# Patient Record
Sex: MALE | Race: WHITE
[De-identification: names, ages, dates, MRNs, and addresses within clinical notes are randomized per-mention and may not be internally consistent; named-entity substitution may affect disease eponyms.]

---

## 2017-07-18 ENCOUNTER — HOSPITAL ENCOUNTER (OUTPATIENT)
Dept: HOSPITAL 47 - RADCTMAIN | Age: 52
Discharge: HOME | End: 2017-07-18
Payer: COMMERCIAL

## 2017-07-18 DIAGNOSIS — K57.32: Primary | ICD-10-CM

## 2017-07-18 DIAGNOSIS — N20.0: ICD-10-CM

## 2017-07-18 DIAGNOSIS — K57.30: ICD-10-CM

## 2017-07-18 PROCEDURE — 74176 CT ABD & PELVIS W/O CONTRAST: CPT

## 2017-07-18 NOTE — CT
EXAMINATION TYPE: CT abdomen pelvis wo con

 

DATE OF EXAM: 7/18/2017

 

COMPARISON: NONE

 

HISTORY: Pt states of abdominal pain.

 

CT DLP: 1036 mGycm

Automated exposure control for dose reduction was used.

 

TECHNIQUE:  Helical acquisition of images was performed from the lung bases through the pelvis.

 

FINDINGS: 

 

Lung bases are clear. There is no pleural effusion.

 

Liver shows a 3 cm cyst in the anterior left lobe.

 

Spleen pancreas gallbladder appear normal. Bile ducts are not dilated.

 

There is no adrenal mass. Kidneys have normal size and contour. There is no hydronephrosis. There is 
a 3 mm calcification in the left kidney. There is no sign of a renal mass. There is no retroperitonea
l adenopathy. Appendix appears normal.

 

There are multiple sigmoid diverticula. There are some inflammatory changes and wall thickening of th
e mid sigmoid colon. There is fat stranding. Bladder distends smoothly.

 

There is no ascites. There is no sign of free air. There is no evidence of bowel obstruction. Bony st
ructures are intact.

IMPRESSION: 

THERE ARE SIGMOID MULTIPLE DIVERTICULA. THERE IS EVIDENCE OF MID SIGMOID DIVERTICULITIS.

 

NONOBSTRUCTING LEFT RENAL CALCULUS. NORMAL APPENDIX. 3 cm hepatic cyst.

## 2022-02-17 ENCOUNTER — HOSPITAL ENCOUNTER (OUTPATIENT)
Dept: HOSPITAL 47 - LABWHC1 | Age: 57
Discharge: HOME | End: 2022-02-17
Attending: SURGERY
Payer: COMMERCIAL

## 2022-02-17 DIAGNOSIS — Z20.822: Primary | ICD-10-CM

## 2022-02-21 ENCOUNTER — HOSPITAL ENCOUNTER (OUTPATIENT)
Dept: HOSPITAL 47 - ORWHC2ENDO | Age: 57
Discharge: HOME | End: 2022-02-21
Attending: SURGERY
Payer: COMMERCIAL

## 2022-02-21 VITALS — RESPIRATION RATE: 16 BRPM

## 2022-02-21 VITALS — SYSTOLIC BLOOD PRESSURE: 132 MMHG | HEART RATE: 64 BPM | DIASTOLIC BLOOD PRESSURE: 87 MMHG

## 2022-02-21 VITALS — BODY MASS INDEX: 31.8 KG/M2

## 2022-02-21 VITALS — TEMPERATURE: 98.3 F

## 2022-02-21 DIAGNOSIS — Z88.1: ICD-10-CM

## 2022-02-21 DIAGNOSIS — Z79.899: ICD-10-CM

## 2022-02-21 DIAGNOSIS — D12.3: ICD-10-CM

## 2022-02-21 DIAGNOSIS — Z80.9: ICD-10-CM

## 2022-02-21 DIAGNOSIS — K64.1: ICD-10-CM

## 2022-02-21 DIAGNOSIS — Z88.2: ICD-10-CM

## 2022-02-21 DIAGNOSIS — Z87.891: ICD-10-CM

## 2022-02-21 DIAGNOSIS — K57.31: ICD-10-CM

## 2022-02-21 DIAGNOSIS — K64.4: ICD-10-CM

## 2022-02-21 DIAGNOSIS — Z98.890: ICD-10-CM

## 2022-02-21 DIAGNOSIS — F41.9: ICD-10-CM

## 2022-02-21 DIAGNOSIS — Z12.11: Primary | ICD-10-CM

## 2022-02-21 DIAGNOSIS — Z82.49: ICD-10-CM

## 2022-02-21 DIAGNOSIS — Z88.0: ICD-10-CM

## 2022-02-21 DIAGNOSIS — E78.5: ICD-10-CM

## 2022-02-21 PROCEDURE — 45380 COLONOSCOPY AND BIOPSY: CPT

## 2022-02-21 PROCEDURE — 88305 TISSUE EXAM BY PATHOLOGIST: CPT

## 2022-02-21 NOTE — P.PCN
Date of Procedure: 02/21/22


Description of Procedure: 




















PREOPERATIVE DIAGNOSIS:


Colonoscopy screening





POSTOPERATIVE DIAGNOSIS:


Tubular adenoma transverse colon


Sigmoid diverticulosis with bleeding


Internal hemorrhoids, grade 2





OPERATION:


Colonoscopy to the ileocecal valve and appendiceal orifice, cecum


Colonoscopy with cold forceps biopsy





SURGEON: Lindsay Marinelli MD.





ANESTHESIA: MAC.





INDICATIONS:


The patient is an 56-year-old male who presents for his first colonoscopy 

screening.  He reports intermittent rectal bleeding from fissures.  Benefits and

risks were described and informed consent was obtained.





DESCRIPTION OF PROCEDURE:


The patient had undergone Sutab prep.  The patient had been brought into the 

operating room and laid in the left lateral decubitus position.  After adequate 

intravenous sedation, the rectum was examined with 2% lidocaine jelly. The 

prostate was unremarkable.  External hemorrhoids were encountered. The rectal 

tone was within normal limits. No lesions were palpated in the rectal vault. An 

Olympus colonoscope was advanced until the cecum, ileocecal valve and 

appendiceal orifice were clearly viewed.  The prep was excellent.  Sigmoid 

diverticulosis was encountered.  Colonic polyps were found and removed.  Active 

bleeding from a single sigmoid diverticulum was identified.  Retroflexion of the

scope demonstrated grade 2 internal hemorrhoids without active bleeding or 

inflammation. The colon was desufflated. The patient had tolerated the procedure

well. 





Withdrawal time was over 6 minutes.





FINDINGS:


Aronchick preparation quality scale 1 (1-5)


Internal hemorrhoids, grade 2


External hemorrhoids, grade 2.


No arteriovenous malformations.


Sigmoid diverticulosis with bleeding from single diverticulum


Removal of 1 polyp:


- Cold forceps biopsy at mid transverse colon, 5 mm polyp.


No focal colitis.





RECOMMENDATIONS:


Repeat colonoscopy in 3 years, 2025





Plan - Discharge Summary


Discharge Rx Participant: No


New Discharge Prescriptions: 


Continue


   Rosuvastatin [Crestor] 20 mg PO DAILY


   ALPRAZolam [Xanax] 0.25 mg PO DAILY PRN


     PRN Reason: Anxiety


   Sertraline [Zoloft] 150 mg PO DAILY


Discharge Medication List





ALPRAZolam [Xanax] 0.25 mg PO DAILY PRN 12/14/21 [History]


Rosuvastatin [Crestor] 20 mg PO DAILY 12/14/21 [History]


Sertraline [Zoloft] 150 mg PO DAILY 12/14/21 [History]








Follow up Appointment(s)/Referral(s): 


Lindsay Marinelli MD [STAFF PHYSICIAN] - 03/01/22


Patient Instructions/Handouts:  Diverticulosis (DC), Diverticulosis Diet (GEN)


Discharge Disposition: HOME SELF-CARE

## 2022-02-21 NOTE — P.GSHP
History of Present Illness


H&P Date: 02/21/22














CHIEF COMPLAINT: Colon screen





HISTORY OF PRESENT ILLNESS: The patient is a 56-year-old male who


presents for colon screen.  Lower endoscopy was offered for further evaluation 

and management.





PAST MEDICAL HISTORY: 


Please see list.





PAST SURGICAL HISTORY: 


Please see list.





MEDICATIONS: 


Please see list.





ALLERGIES:  Please see list. 





SOCIAL HISTORY: No illicit drug use





FAMILY HISTORY: No reports of Crohn disease or ulcerative colitis. 





REVIEW OF ORGAN SYSTEMS: 


CONSTITUTIONAL: No reports of fevers or chills.  





PHYSICAL EXAM: 


VITAL SIGNS:  Stable


GENERAL: Well-developed pleasant in no acute distress. 


HEENT: No scleral icterus. Extraocular movements grossly


intact. Moist buccal mucosa. 


NECK: Supple without lymphadenopathy. 


CHEST: Unlabored respirations. Equal bilateral excursions. 


CARDIOVASCULAR: Regular rate and rhythm. Distal 2+ pulses. 


ABDOMEN: Soft, nontender, nondistended. 


MUSCULOSKELETAL: No clubbing, cyanosis, or edema. 





ASSESSMENT: 


1.  Colon screen.





PLAN: 


1. Recommend proceeding with a lower endoscopy





Past Medical History


Past Medical History: Hyperlipidemia


History of Any Multi-Drug Resistant Organisms: None Reported


Past Surgical History: Adenoidectomy, Hernia Repair, Tonsillectomy


Past Anesthesia/Blood Transfusion Reactions: No Reported Reaction


Past Psychological History: Anxiety


Smoking Status: Former smoker


Past Alcohol Use History: Occasional


Additional Past Alcohol Use History / Comment(s): QUIT SMOKING 2001.


Past Drug Use History: None Reported





- Past Family History


  ** Mother


Family Medical History: Cancer





  ** Brother(s)


Family Medical History: Deep Vein Thrombosis (DVT)





Medications and Allergies


                                Home Medications











 Medication  Instructions  Recorded  Confirmed  Type


 


ALPRAZolam [Xanax] 0.25 mg PO DAILY PRN 12/14/21 02/16/22 History


 


Rosuvastatin [Crestor] 20 mg PO DAILY 12/14/21 02/16/22 History


 


Sertraline [Zoloft] 150 mg PO DAILY 12/14/21 02/16/22 History








                                    Allergies











Allergy/AdvReac Type Severity Reaction Status Date / Time


 


erythromycin base Allergy  Swelling Verified 02/16/22 10:08


 


Penicillins Allergy  Swelling Verified 02/16/22 10:08


 


Sulfa (Sulfonamide Allergy  Swelling Verified 02/16/22 10:08





Antibiotics)

## 2022-06-27 ENCOUNTER — HOSPITAL ENCOUNTER (EMERGENCY)
Dept: HOSPITAL 47 - EC | Age: 57
Discharge: HOME | End: 2022-06-27
Payer: COMMERCIAL

## 2022-06-27 VITALS — HEART RATE: 63 BPM

## 2022-06-27 VITALS — SYSTOLIC BLOOD PRESSURE: 136 MMHG | TEMPERATURE: 98.5 F | DIASTOLIC BLOOD PRESSURE: 81 MMHG | RESPIRATION RATE: 18 BRPM

## 2022-06-27 DIAGNOSIS — Z88.0: ICD-10-CM

## 2022-06-27 DIAGNOSIS — E78.5: ICD-10-CM

## 2022-06-27 DIAGNOSIS — U07.1: Primary | ICD-10-CM

## 2022-06-27 DIAGNOSIS — Z88.1: ICD-10-CM

## 2022-06-27 DIAGNOSIS — Z88.2: ICD-10-CM

## 2022-06-27 DIAGNOSIS — Z87.891: ICD-10-CM

## 2022-06-27 PROCEDURE — 99283 EMERGENCY DEPT VISIT LOW MDM: CPT

## 2022-06-27 NOTE — ED
URI HPI





- General


Chief Complaint: Upper Respiratory Infection


Stated Complaint: covid+, wants infusion


Time Seen by Provider: 06/27/22 12:35


Source: patient, RN notes reviewed


Mode of arrival: ambulatory


Limitations: no limitations





- History of Present Illness


Initial Comments: 


This a 57-year-old male presents emergency Department chief complaint of COVID-

19 positive.  Patient states he tested positive his here for monoclonal 

antibodies.  Patient states she started symptoms on Saturday his symptoms do 

include nasal congestion, cough, sore throat, body aches denies any significant 

shortness of breath, chest pain denies vomiting diarrhea constipation.








- Related Data


                                Home Medications











 Medication  Instructions  Recorded  Confirmed


 


ALPRAZolam [Xanax] 0.25 mg PO DAILY PRN 12/14/21 02/16/22


 


Rosuvastatin [Crestor] 20 mg PO DAILY 12/14/21 02/16/22


 


Sertraline [Zoloft] 150 mg PO DAILY 12/14/21 02/16/22











                                    Allergies











Allergy/AdvReac Type Severity Reaction Status Date / Time


 


erythromycin base Allergy  Swelling Verified 06/27/22 12:38


 


Penicillins Allergy  Swelling Verified 06/27/22 12:38


 


Sulfa (Sulfonamide Allergy  Swelling Verified 06/27/22 12:38





Antibiotics)     














Review of Systems


ROS Statement: 


Those systems with pertinent positive or pertinent negative responses have been 

documented in the HPI.





ROS Other: All systems not noted in ROS Statement are negative.





Past Medical History


Past Medical History: Hyperlipidemia


History of Any Multi-Drug Resistant Organisms: None Reported


Past Surgical History: Adenoidectomy, Hernia Repair, Tonsillectomy


Past Anesthesia/Blood Transfusion Reactions: No Reported Reaction


Past Psychological History: Anxiety


Smoking Status: Former smoker


Past Alcohol Use History: Occasional


Past Drug Use History: None Reported





- Past Family History


  ** Mother


Family Medical History: Cancer





  ** Brother(s)


Family Medical History: Deep Vein Thrombosis (DVT)





General Exam


Limitations: no limitations


General appearance: alert, in no apparent distress


Head exam: Present: atraumatic, normocephalic, normal inspection


Eye exam: Present: normal appearance, PERRL, EOMI.  Absent: scleral icterus, 

conjunctival injection, periorbital swelling


ENT exam: Present: normal exam, normal oropharynx, mucous membranes moist


Neck exam: Present: normal inspection.  Absent: tenderness, meningismus, 

lymphadenopathy


Respiratory exam: Present: normal lung sounds bilaterally.  Absent: respiratory 

distress, wheezes, rales, rhonchi, stridor


Cardiovascular Exam: Present: regular rate, normal rhythm, normal heart sounds. 

 Absent: systolic murmur, diastolic murmur, rubs, gallop, clicks





Course





                                   Vital Signs











  06/27/22





  12:38


 


Temperature 98 F


 


Pulse Rate 63


 


Respiratory 16





Rate 


 


Blood Pressure 135/87


 


O2 Sat by Pulse 95





Oximetry 














Medical Decision Making





- Medical Decision Making





Patient did receive monoclonal antibodies will be discharged in stable condition

 return parameters were discussed.





Disposition


Clinical Impression: 


 COVID-19





Disposition: HOME SELF-CARE


Condition: Stable


Instructions (If sedation given, give patient instructions):  COVID-19 

(Coronavirus Disease 2019) (ED)


Additional Instructions: 


Please return to the Emergency Department if symptoms worsen or any other 

concerns.


Is patient prescribed a controlled substance at d/c from ED?: No


Referrals: 


Lucio Candelaria MD [Primary Care Provider] - 1-2 days


Time of Disposition: 12:51

## 2022-07-02 ENCOUNTER — HOSPITAL ENCOUNTER (EMERGENCY)
Dept: HOSPITAL 47 - EC | Age: 57
Discharge: HOME | End: 2022-07-02
Payer: COMMERCIAL

## 2022-07-02 VITALS — DIASTOLIC BLOOD PRESSURE: 95 MMHG | SYSTOLIC BLOOD PRESSURE: 139 MMHG | TEMPERATURE: 97.9 F

## 2022-07-02 VITALS — HEART RATE: 65 BPM

## 2022-07-02 VITALS — RESPIRATION RATE: 18 BRPM

## 2022-07-02 DIAGNOSIS — Z88.0: ICD-10-CM

## 2022-07-02 DIAGNOSIS — Z88.1: ICD-10-CM

## 2022-07-02 DIAGNOSIS — U07.1: Primary | ICD-10-CM

## 2022-07-02 DIAGNOSIS — R10.32: ICD-10-CM

## 2022-07-02 DIAGNOSIS — Z88.2: ICD-10-CM

## 2022-07-02 DIAGNOSIS — E78.5: ICD-10-CM

## 2022-07-02 DIAGNOSIS — Z87.891: ICD-10-CM

## 2022-07-02 LAB
ALBUMIN SERPL-MCNC: 4.8 G/DL (ref 3.5–5)
ALP SERPL-CCNC: 74 U/L (ref 38–126)
ALT SERPL-CCNC: 45 U/L (ref 4–49)
AMYLASE SERPL-CCNC: 49 U/L (ref 30–110)
ANION GAP SERPL CALC-SCNC: 14 MMOL/L
AST SERPL-CCNC: 36 U/L (ref 17–59)
BASOPHILS # BLD AUTO: 0 K/UL (ref 0–0.2)
BASOPHILS NFR BLD AUTO: 0 %
BUN SERPL-SCNC: 12 MG/DL (ref 9–20)
CALCIUM SPEC-MCNC: 9.5 MG/DL (ref 8.4–10.2)
CHLORIDE SERPL-SCNC: 106 MMOL/L (ref 98–107)
CO2 SERPL-SCNC: 17 MMOL/L (ref 22–30)
EOSINOPHIL # BLD AUTO: 0.1 K/UL (ref 0–0.7)
EOSINOPHIL NFR BLD AUTO: 1 %
ERYTHROCYTE [DISTWIDTH] IN BLOOD BY AUTOMATED COUNT: 5.11 M/UL (ref 4.3–5.9)
ERYTHROCYTE [DISTWIDTH] IN BLOOD: 12.8 % (ref 11.5–15.5)
GLUCOSE SERPL-MCNC: 178 MG/DL (ref 74–99)
HCT VFR BLD AUTO: 44.7 % (ref 39–53)
HGB BLD-MCNC: 15.8 GM/DL (ref 13–17.5)
LIPASE SERPL-CCNC: 78 U/L (ref 23–300)
LYMPHOCYTES # SPEC AUTO: 0.7 K/UL (ref 1–4.8)
LYMPHOCYTES NFR SPEC AUTO: 7 %
MCH RBC QN AUTO: 31 PG (ref 25–35)
MCHC RBC AUTO-ENTMCNC: 35.3 G/DL (ref 31–37)
MCV RBC AUTO: 87.7 FL (ref 80–100)
MONOCYTES # BLD AUTO: 0.5 K/UL (ref 0–1)
MONOCYTES NFR BLD AUTO: 4 %
NEUTROPHILS # BLD AUTO: 9.8 K/UL (ref 1.3–7.7)
NEUTROPHILS NFR BLD AUTO: 88 %
PH UR: 7.5 [PH] (ref 5–8)
PLATELET # BLD AUTO: 235 K/UL (ref 150–450)
POTASSIUM SERPL-SCNC: 3.8 MMOL/L (ref 3.5–5.1)
PROT SERPL-MCNC: 8 G/DL (ref 6.3–8.2)
SODIUM SERPL-SCNC: 137 MMOL/L (ref 137–145)
SP GR UR: 1.05 (ref 1–1.03)
UROBILINOGEN UR QL STRIP: <2 MG/DL (ref ?–2)
WBC # BLD AUTO: 11.2 K/UL (ref 3.8–10.6)

## 2022-07-02 PROCEDURE — 85025 COMPLETE CBC W/AUTO DIFF WBC: CPT

## 2022-07-02 PROCEDURE — 36415 COLL VENOUS BLD VENIPUNCTURE: CPT

## 2022-07-02 PROCEDURE — 87635 SARS-COV-2 COVID-19 AMP PRB: CPT

## 2022-07-02 PROCEDURE — 96374 THER/PROPH/DIAG INJ IV PUSH: CPT

## 2022-07-02 PROCEDURE — 74177 CT ABD & PELVIS W/CONTRAST: CPT

## 2022-07-02 PROCEDURE — 87502 INFLUENZA DNA AMP PROBE: CPT

## 2022-07-02 PROCEDURE — 81003 URINALYSIS AUTO W/O SCOPE: CPT

## 2022-07-02 PROCEDURE — 83690 ASSAY OF LIPASE: CPT

## 2022-07-02 PROCEDURE — 80053 COMPREHEN METABOLIC PANEL: CPT

## 2022-07-02 PROCEDURE — 82150 ASSAY OF AMYLASE: CPT

## 2022-07-02 PROCEDURE — 83605 ASSAY OF LACTIC ACID: CPT

## 2022-07-02 PROCEDURE — 99284 EMERGENCY DEPT VISIT MOD MDM: CPT

## 2022-07-02 PROCEDURE — 96375 TX/PRO/DX INJ NEW DRUG ADDON: CPT

## 2022-07-02 PROCEDURE — 96361 HYDRATE IV INFUSION ADD-ON: CPT

## 2022-07-02 NOTE — ED
General Adult HPI





- General


Chief complaint: Abdominal Pain


Stated complaint: Covid+, abd pain


Time Seen by Provider: 07/02/22 07:58


Source: patient, RN notes reviewed, old records reviewed


Mode of arrival: wheelchair


Limitations: no limitations





- History of Present Illness


Initial comments: 





57-year-old non-toxic appearing male presents to the emergency room with left 

lower quadrant pain that started around dinnertime last night.  He does have a 

history of diverticulitis and kidney stones but states this does not feel the 

same.  He denies any fevers.  He has vomited twice.  He states he was diagnosed 

with Covid and he received monoclonal antibodies on Monday and was feeling 

better until last night.  Covid symptoms started last Saturday but did not have 

nausea, vomiting or abdominal pain at that time.  He is a nonsmoker.  


-: days(s) (1)


Location: abdomen (Left lower quadrant)


Radiation: non-radiation


Severity scale (1-10): 8


Quality: other (Cramping)


Consistency: constant


Improves with: none


Worsens with: none


Associated Symptoms: nausea/vomiting (Vomited twice)


Treatments Prior to Arrival: none





- Related Data


                                Home Medications











 Medication  Instructions  Recorded  Confirmed


 


ALPRAZolam [Xanax] 0.25 mg PO DAILY PRN 12/14/21 02/16/22


 


Rosuvastatin [Crestor] 20 mg PO DAILY 12/14/21 02/16/22


 


Sertraline [Zoloft] 150 mg PO DAILY 12/14/21 02/16/22











                                    Allergies











Allergy/AdvReac Type Severity Reaction Status Date / Time


 


erythromycin base Allergy  Swelling Verified 07/02/22 07:26


 


Penicillins Allergy  Swelling Verified 07/02/22 07:26


 


Sulfa (Sulfonamide Allergy  Swelling Verified 07/02/22 07:26





Antibiotics)     














Review of Systems


ROS Statement: 


Those systems with pertinent positive or pertinent negative responses have been 

documented in the HPI.





ROS Other: All systems not noted in ROS Statement are negative.





Past Medical History


Past Medical History: Hyperlipidemia


History of Any Multi-Drug Resistant Organisms: None Reported


Past Surgical History: Adenoidectomy, Hernia Repair, Tonsillectomy


Past Anesthesia/Blood Transfusion Reactions: No Reported Reaction


Past Psychological History: Anxiety


Smoking Status: Former smoker


Past Alcohol Use History: Occasional


Past Drug Use History: None Reported





- Past Family History


  ** Mother


Family Medical History: Cancer





  ** Brother(s)


Family Medical History: Deep Vein Thrombosis (DVT)





General Exam


Limitations: no limitations


General appearance: alert, in no apparent distress


Head exam: Present: atraumatic


Eye exam: Absent: scleral icterus, conjunctival injection


Respiratory exam: Present: normal lung sounds bilaterally.  Absent: respiratory 

distress, accessory muscle use


Cardiovascular Exam: Present: regular rate


GI/Abdominal exam: Present: soft, tenderness (Left lower quadrant).  Absent: 

distended, guarding, rebound, rigid


Extremities exam: Present: normal inspection, normal capillary refill.  Absent: 

pedal edema


Back exam: Absent: tenderness, CVA tenderness (R), CVA tenderness (L)


Neurological exam: Present: alert, oriented X3


Psychiatric exam: Present: normal affect, normal mood


Skin exam: Present: warm, dry, normal color.  Absent: cyanosis, diaphoretic, 

petechiae, pallor





Course


                                   Vital Signs











  07/02/22 07/02/22 07/02/22





  07:21 09:35 10:41


 


Temperature 97.5 F L  97.9 F


 


Pulse Rate 72 65 65


 


Respiratory 18 18 18





Rate   


 


Blood Pressure 169/98 169/86 139/95


 


O2 Sat by Pulse 98 96 98





Oximetry   














- Reevaluation(s)


Reevaluation #1: 





07/02/22 09:15


Patient states that he is feeling better after the Zofran, IV fluids and pain 

medication.  There is no evidence of diverticulitis on CT. There is evidence of 

a left renal stone patient does have a history of kidney stones.  Patient 

encouraged to provide UA.  Patient is requesting to be tested for influenza and 

Covid.


Time: 09:15





Medical Decision Making





- Medical Decision Making


Patient complaining of left lower abdominal pain since last night.  Denies any 

fevers.


CT shows a left nonobstructing renal calculus.  Diverticulosis without 

diverticulitis.  No acute intra-abdominal process noted.


Lactic acid 2.1 patient was given 1 L of normal saline.  No significant 

leukocytosis.


Urinalysis shows no evidence of hematuria.  Patient still testing positive for 

coronavirus.  He did receive monoclonal antibody infusion on Monday.


His symptoms are likely related to his viral infection.  I encouraged him to 

return back to the emergency room with any increased pain or fevers.  Follow-up 

with his primary care doctor next week. He is agreeable to this plan of care.


Case discussed with Dr. Cruz.








- Lab Data


Result diagrams: 


                                 07/02/22 08:06





                                 07/02/22 08:06


                                   Lab Results











  07/02/22 07/02/22 07/02/22 Range/Units





  08:06 08:06 08:06 


 


WBC  11.2 H    (3.8-10.6)  k/uL


 


RBC  5.11    (4.30-5.90)  m/uL


 


Hgb  15.8    (13.0-17.5)  gm/dL


 


Hct  44.7    (39.0-53.0)  %


 


MCV  87.7    (80.0-100.0)  fL


 


MCH  31.0    (25.0-35.0)  pg


 


MCHC  35.3    (31.0-37.0)  g/dL


 


RDW  12.8    (11.5-15.5)  %


 


Plt Count  235    (150-450)  k/uL


 


MPV  7.1    


 


Neutrophils %  88    %


 


Lymphocytes %  7    %


 


Monocytes %  4    %


 


Eosinophils %  1    %


 


Basophils %  0    %


 


Neutrophils #  9.8 H    (1.3-7.7)  k/uL


 


Lymphocytes #  0.7 L    (1.0-4.8)  k/uL


 


Monocytes #  0.5    (0-1.0)  k/uL


 


Eosinophils #  0.1    (0-0.7)  k/uL


 


Basophils #  0.0    (0-0.2)  k/uL


 


Sodium    137  (137-145)  mmol/L


 


Potassium    3.8  (3.5-5.1)  mmol/L


 


Chloride    106  ()  mmol/L


 


Carbon Dioxide    17 L  (22-30)  mmol/L


 


Anion Gap    14  mmol/L


 


BUN    12  (9-20)  mg/dL


 


Creatinine    0.74  (0.66-1.25)  mg/dL


 


Est GFR (CKD-EPI)AfAm    >90  (>60 ml/min/1.73 sqM)  


 


Est GFR (CKD-EPI)NonAf    >90  (>60 ml/min/1.73 sqM)  


 


Glucose    178 H  (74-99)  mg/dL


 


Lactic Ac Sepsis Rflx     


 


Plasma Lactic Acid Franko     (0.7-2.0)  mmol/L


 


Calcium    9.5  (8.4-10.2)  mg/dL


 


Total Bilirubin    0.6  (0.2-1.3)  mg/dL


 


AST    36  (17-59)  U/L


 


ALT    45  (4-49)  U/L


 


Alkaline Phosphatase    74  ()  U/L


 


Total Protein    8.0  (6.3-8.2)  g/dL


 


Albumin    4.8  (3.5-5.0)  g/dL


 


Amylase    49  ()  U/L


 


Lipase    78  ()  U/L


 


Urine Color   Light Yellow   


 


Urine Appearance   Clear   (Clear)  


 


Urine pH   7.5   (5.0-8.0)  


 


Ur Specific Gravity   1.050 H   (1.001-1.035)  


 


Urine Protein   Negative   (Negative)  


 


Urine Glucose (UA)   Trace H   (Negative)  


 


Urine Ketones   Negative   (Negative)  


 


Urine Blood   Negative   (Negative)  


 


Urine Nitrite   Negative   (Negative)  


 


Urine Bilirubin   Negative   (Negative)  


 


Urine Urobilinogen   <2.0   (<2.0)  mg/dL


 


Ur Leukocyte Esterase   Negative   (Negative)  


 


Coronavirus (PCR)     (Not Detectd)  


 


Influenza Type A RNA     (Not Detectd)  


 


Influenza Type B (PCR)     (Not Detectd)  














  07/02/22 07/02/22 07/02/22 Range/Units





  08:06 08:40 09:31 


 


WBC     (3.8-10.6)  k/uL


 


RBC     (4.30-5.90)  m/uL


 


Hgb     (13.0-17.5)  gm/dL


 


Hct     (39.0-53.0)  %


 


MCV     (80.0-100.0)  fL


 


MCH     (25.0-35.0)  pg


 


MCHC     (31.0-37.0)  g/dL


 


RDW     (11.5-15.5)  %


 


Plt Count     (150-450)  k/uL


 


MPV     


 


Neutrophils %     %


 


Lymphocytes %     %


 


Monocytes %     %


 


Eosinophils %     %


 


Basophils %     %


 


Neutrophils #     (1.3-7.7)  k/uL


 


Lymphocytes #     (1.0-4.8)  k/uL


 


Monocytes #     (0-1.0)  k/uL


 


Eosinophils #     (0-0.7)  k/uL


 


Basophils #     (0-0.2)  k/uL


 


Sodium     (137-145)  mmol/L


 


Potassium     (3.5-5.1)  mmol/L


 


Chloride     ()  mmol/L


 


Carbon Dioxide     (22-30)  mmol/L


 


Anion Gap     mmol/L


 


BUN     (9-20)  mg/dL


 


Creatinine     (0.66-1.25)  mg/dL


 


Est GFR (CKD-EPI)AfAm     (>60 ml/min/1.73 sqM)  


 


Est GFR (CKD-EPI)NonAf     (>60 ml/min/1.73 sqM)  


 


Glucose     (74-99)  mg/dL


 


Lactic Ac Sepsis Rflx   Y   


 


Plasma Lactic Acid Franko  2.1 H*    (0.7-2.0)  mmol/L


 


Calcium     (8.4-10.2)  mg/dL


 


Total Bilirubin     (0.2-1.3)  mg/dL


 


AST     (17-59)  U/L


 


ALT     (4-49)  U/L


 


Alkaline Phosphatase     ()  U/L


 


Total Protein     (6.3-8.2)  g/dL


 


Albumin     (3.5-5.0)  g/dL


 


Amylase     ()  U/L


 


Lipase     ()  U/L


 


Urine Color     


 


Urine Appearance     (Clear)  


 


Urine pH     (5.0-8.0)  


 


Ur Specific Gravity     (1.001-1.035)  


 


Urine Protein     (Negative)  


 


Urine Glucose (UA)     (Negative)  


 


Urine Ketones     (Negative)  


 


Urine Blood     (Negative)  


 


Urine Nitrite     (Negative)  


 


Urine Bilirubin     (Negative)  


 


Urine Urobilinogen     (<2.0)  mg/dL


 


Ur Leukocyte Esterase     (Negative)  


 


Coronavirus (PCR)     (Not Detectd)  


 


Influenza Type A RNA    Not Detected  (Not Detectd)  


 


Influenza Type B (PCR)    Not Detected  (Not Detectd)  














  07/02/22 Range/Units





  09:31 


 


WBC   (3.8-10.6)  k/uL


 


RBC   (4.30-5.90)  m/uL


 


Hgb   (13.0-17.5)  gm/dL


 


Hct   (39.0-53.0)  %


 


MCV   (80.0-100.0)  fL


 


MCH   (25.0-35.0)  pg


 


MCHC   (31.0-37.0)  g/dL


 


RDW   (11.5-15.5)  %


 


Plt Count   (150-450)  k/uL


 


MPV   


 


Neutrophils %   %


 


Lymphocytes %   %


 


Monocytes %   %


 


Eosinophils %   %


 


Basophils %   %


 


Neutrophils #   (1.3-7.7)  k/uL


 


Lymphocytes #   (1.0-4.8)  k/uL


 


Monocytes #   (0-1.0)  k/uL


 


Eosinophils #   (0-0.7)  k/uL


 


Basophils #   (0-0.2)  k/uL


 


Sodium   (137-145)  mmol/L


 


Potassium   (3.5-5.1)  mmol/L


 


Chloride   ()  mmol/L


 


Carbon Dioxide   (22-30)  mmol/L


 


Anion Gap   mmol/L


 


BUN   (9-20)  mg/dL


 


Creatinine   (0.66-1.25)  mg/dL


 


Est GFR (CKD-EPI)AfAm   (>60 ml/min/1.73 sqM)  


 


Est GFR (CKD-EPI)NonAf   (>60 ml/min/1.73 sqM)  


 


Glucose   (74-99)  mg/dL


 


Lactic Ac Sepsis Rflx   


 


Plasma Lactic Acid Franko   (0.7-2.0)  mmol/L


 


Calcium   (8.4-10.2)  mg/dL


 


Total Bilirubin   (0.2-1.3)  mg/dL


 


AST   (17-59)  U/L


 


ALT   (4-49)  U/L


 


Alkaline Phosphatase   ()  U/L


 


Total Protein   (6.3-8.2)  g/dL


 


Albumin   (3.5-5.0)  g/dL


 


Amylase   ()  U/L


 


Lipase   ()  U/L


 


Urine Color   


 


Urine Appearance   (Clear)  


 


Urine pH   (5.0-8.0)  


 


Ur Specific Gravity   (1.001-1.035)  


 


Urine Protein   (Negative)  


 


Urine Glucose (UA)   (Negative)  


 


Urine Ketones   (Negative)  


 


Urine Blood   (Negative)  


 


Urine Nitrite   (Negative)  


 


Urine Bilirubin   (Negative)  


 


Urine Urobilinogen   (<2.0)  mg/dL


 


Ur Leukocyte Esterase   (Negative)  


 


Coronavirus (PCR)  Detected A  (Not Detectd)  


 


Influenza Type A RNA   (Not Detectd)  


 


Influenza Type B (PCR)   (Not Detectd)  














Disposition


Clinical Impression: 


 Abdominal pain, COVID-19





Disposition: HOME SELF-CARE


Instructions (If sedation given, give patient instructions):  Abdominal Pain 

(ED), COVID-19 (Coronavirus Disease 2019) (ED)


Additional Instructions: 


Increase your fluid intake.  Tylenol and Motrin as needed for any pain or 

discomfort.  Follow-up with the primary care doctor nexxt week.  Return to 

emergency room if any new or concerning symptoms including increased abdominal 

pain or persistent nausea vomiting.


Is patient prescribed a controlled substance at d/c from ED?: No


Referrals: 


Lucio Candelaria MD [Primary Care Provider] - 1-2 days


Time of Disposition: 10:27

## 2022-07-02 NOTE — CT
EXAMINATION TYPE: CT abdomen pelvis w con

CT DLP: 1438.4 mGycm, Automated exposure control for dose reduction was used.

 

DATE OF EXAM: 7/2/2022 8:33 AM

 

COMPARISON: CT abdomen pelvis most recent from 7/18/2017 .

CLINICAL INDICATION:Male, 57 years old with history of abdominal pain; Cramping, lower abd pain, hist
ory of diverticulitis, covid positive

 

TECHNIQUE:  Standard  CT of the abdomen and pelvis following the administration of 100 cc of Isovue 3
00 IV contrast material. Coronal and sagittal reformats were performed. 

 

FINDINGS: 

LOWER CHEST: Unremarkable

 

ABDOMEN

LIVER: Scattered hypoattenuating lesions which are too small to characterize, a larger cyst measures 
up to 3.9 cm in the left hepatic lobe. These are similar to 2017. Diffusely hypoattenuating.

GALLBLADDER AND BILE DUCTS: Unremarkable.

PANCREAS: Unremarkable.

SPLEEN: Unremarkable.

ADRENAL GLANDS: Unremarkable.

KIDNEYS AND URETERS: No evidence of hydronephrosis. Nonobstructing left 4 mm renal calculus. Bilatera
l probable renal cysts.

 

PELVIS

BLADDER: Unremarkable

REPRODUCTIVE: Unremarkable.

 

ABDOMEN & PELVIS

STOMACH AND BOWEL: Scattered clonic diverticula are present. There is redundant sigmoid colon. Append
ix is not definitively visualized. No evidence of bowel obstruction. 

PERITONEUM: No evidence of pneumoperitoneum or free fluid.

 

VASCULATURE: No evidence of aortic aneurysm. 

MUSCULOSKELETAL: No acute osseous abnormalities

LYMPH NODES: No gross evidence for lymphadenopathy.

SOFT TISSUE/ABDOMINAL WALL: Ventral wall fat-containing hernias

 

IMPRESSION:

1.  No evidence for acute intra-abdominal process.

2.  Colonic diverticulosis without evidence for diverticulitis.

3.  Left nonobstructing renal calculus.

4.  Stable hepatic and renal cysts.

## 2022-07-03 ENCOUNTER — HOSPITAL ENCOUNTER (EMERGENCY)
Dept: HOSPITAL 47 - EC | Age: 57
Discharge: HOME | End: 2022-07-03
Payer: COMMERCIAL

## 2022-07-03 VITALS — HEART RATE: 87 BPM | SYSTOLIC BLOOD PRESSURE: 126 MMHG | DIASTOLIC BLOOD PRESSURE: 77 MMHG

## 2022-07-03 VITALS — RESPIRATION RATE: 18 BRPM

## 2022-07-03 VITALS — TEMPERATURE: 98.9 F

## 2022-07-03 DIAGNOSIS — E86.0: Primary | ICD-10-CM

## 2022-07-03 DIAGNOSIS — Z88.2: ICD-10-CM

## 2022-07-03 DIAGNOSIS — Z88.1: ICD-10-CM

## 2022-07-03 DIAGNOSIS — E78.5: ICD-10-CM

## 2022-07-03 DIAGNOSIS — M54.50: ICD-10-CM

## 2022-07-03 DIAGNOSIS — Z88.0: ICD-10-CM

## 2022-07-03 LAB
ALBUMIN SERPL-MCNC: 4.7 G/DL (ref 3.5–5)
ALP SERPL-CCNC: 70 U/L (ref 38–126)
ALT SERPL-CCNC: 33 U/L (ref 4–49)
ANION GAP SERPL CALC-SCNC: 12 MMOL/L
AST SERPL-CCNC: 26 U/L (ref 17–59)
BASOPHILS # BLD AUTO: 0.1 K/UL (ref 0–0.2)
BASOPHILS NFR BLD AUTO: 0 %
BUN SERPL-SCNC: 12 MG/DL (ref 9–20)
CALCIUM SPEC-MCNC: 9.4 MG/DL (ref 8.4–10.2)
CHLORIDE SERPL-SCNC: 103 MMOL/L (ref 98–107)
CO2 SERPL-SCNC: 22 MMOL/L (ref 22–30)
EOSINOPHIL # BLD AUTO: 0.1 K/UL (ref 0–0.7)
EOSINOPHIL NFR BLD AUTO: 1 %
ERYTHROCYTE [DISTWIDTH] IN BLOOD BY AUTOMATED COUNT: 5.53 M/UL (ref 4.3–5.9)
ERYTHROCYTE [DISTWIDTH] IN BLOOD: 13.4 % (ref 11.5–15.5)
GLUCOSE SERPL-MCNC: 162 MG/DL (ref 74–99)
GRAN CASTS UR QL COMP ASSIST: 1 /LPF
HCT VFR BLD AUTO: 49.3 % (ref 39–53)
HGB BLD-MCNC: 17.1 GM/DL (ref 13–17.5)
HYALINE CASTS UR QL AUTO: 7 /LPF (ref 0–2)
LYMPHOCYTES # SPEC AUTO: 0.9 K/UL (ref 1–4.8)
LYMPHOCYTES NFR SPEC AUTO: 6 %
MCH RBC QN AUTO: 30.8 PG (ref 25–35)
MCHC RBC AUTO-ENTMCNC: 34.6 G/DL (ref 31–37)
MCV RBC AUTO: 89.1 FL (ref 80–100)
MONOCYTES # BLD AUTO: 0.9 K/UL (ref 0–1)
MONOCYTES NFR BLD AUTO: 6 %
NEUTROPHILS # BLD AUTO: 13 K/UL (ref 1.3–7.7)
NEUTROPHILS NFR BLD AUTO: 86 %
PH UR: 5.5 [PH] (ref 5–8)
PLATELET # BLD AUTO: 324 K/UL (ref 150–450)
POTASSIUM SERPL-SCNC: 4.1 MMOL/L (ref 3.5–5.1)
PROT SERPL-MCNC: 7.6 G/DL (ref 6.3–8.2)
PROT UR QL: (no result)
RBC UR QL: <1 /HPF (ref 0–5)
SODIUM SERPL-SCNC: 137 MMOL/L (ref 137–145)
SP GR UR: 1.01 (ref 1–1.03)
UROBILINOGEN UR QL STRIP: <2 MG/DL (ref ?–2)
WBC # BLD AUTO: 15.1 K/UL (ref 3.8–10.6)
WBC #/AREA URNS HPF: 8 /HPF (ref 0–5)

## 2022-07-03 PROCEDURE — 85025 COMPLETE CBC W/AUTO DIFF WBC: CPT

## 2022-07-03 PROCEDURE — 80053 COMPREHEN METABOLIC PANEL: CPT

## 2022-07-03 PROCEDURE — 96375 TX/PRO/DX INJ NEW DRUG ADDON: CPT

## 2022-07-03 PROCEDURE — 96361 HYDRATE IV INFUSION ADD-ON: CPT

## 2022-07-03 PROCEDURE — 83605 ASSAY OF LACTIC ACID: CPT

## 2022-07-03 PROCEDURE — 36415 COLL VENOUS BLD VENIPUNCTURE: CPT

## 2022-07-03 PROCEDURE — 96374 THER/PROPH/DIAG INJ IV PUSH: CPT

## 2022-07-03 PROCEDURE — 81001 URINALYSIS AUTO W/SCOPE: CPT

## 2022-07-03 PROCEDURE — 93005 ELECTROCARDIOGRAM TRACING: CPT

## 2022-07-03 PROCEDURE — 99284 EMERGENCY DEPT VISIT MOD MDM: CPT

## 2022-07-03 NOTE — ED
Abdominal Pain HPI





<Chris Ballard - Last Filed: 07/03/22 07:21>





- General


Source: patient, RN notes reviewed


Mode of arrival: ambulatory


Limitations: no limitations





- History of Present Illness


MD Complaint: abdominal pain


Location: [facility], [facility]





<Anne-Marie Meadows - Last Filed: 07/03/22 16:40>





- General


Chief Complaint: Back Pain/Injury


Stated Complaint: kidney stone


Time Seen by Provider: 07/03/22 00:52





- History of Present Illness


Initial Comments: 


This is a 57-year-old male who presents to the emergency department for 

abdominal pain.  Patient was evaluated here yesterday for left lower quadrant 

pain.  He was diagnosed with a nonobstructing left renal calculus and his 

symptoms were well controlled in the emergency department.  Currently, he states

that the pain is now more prominent in the right lower quadrant.  The computed 

tomography scan obtained yesterday could not well visualize his appendix.  

Denies any nausea or vomiting.





Denies any fevers, chills, sore throat, cough, dyspnea, chest pain, 

palpitations, nausea, vomiting, diarrhea, back pain, or headaches.


 (Anne-Marie Meadows)





- Related Data


                                Home Medications











 Medication  Instructions  Recorded  Confirmed


 


ALPRAZolam [Xanax] 0.25 mg PO DAILY PRN 12/14/21 02/16/22


 


Rosuvastatin [Crestor] 20 mg PO DAILY 12/14/21 02/16/22


 


Sertraline [Zoloft] 150 mg PO DAILY 12/14/21 02/16/22











                                    Allergies











Allergy/AdvReac Type Severity Reaction Status Date / Time


 


erythromycin base Allergy  Swelling Verified 07/03/22 00:23


 


Penicillins Allergy  Swelling Verified 07/03/22 00:23


 


Sulfa (Sulfonamide Allergy  Swelling Verified 07/03/22 00:23





Antibiotics)     














Review of Systems


ROS Other: All systems not noted in ROS Statement are negative.





<Chris Ballard - Last Filed: 07/03/22 07:21>


ROS Other: All systems not noted in ROS Statement are negative.





<Anne-Marie Meadows - Last Filed: 07/03/22 16:40>


ROS Statement: 


Those systems with pertinent positive or pertinent negative responses have been 

documented in the HPI.








Past Medical History


Past Medical History: Hyperlipidemia


History of Any Multi-Drug Resistant Organisms: None Reported


Past Surgical History: Adenoidectomy, Hernia Repair, Tonsillectomy


Past Anesthesia/Blood Transfusion Reactions: No Reported Reaction


Past Psychological History: Anxiety


Smoking Status: Former smoker


Past Alcohol Use History: Occasional


Past Drug Use History: None Reported





- Past Family History


  ** Mother


Family Medical History: Cancer





  ** Brother(s)


Family Medical History: Deep Vein Thrombosis (DVT)





<Anne-Marie Meadows - Last Filed: 07/03/22 16:40>





General Exam


Limitations: no limitations


General appearance: alert, in distress


Head exam: Present: atraumatic, normocephalic, normal inspection


Respiratory exam: Present: normal lung sounds bilaterally.  Absent: respiratory 

distress, wheezes, rales, rhonchi, stridor


Cardiovascular Exam: Present: regular rate, normal rhythm, normal heart sounds. 

 Absent: systolic murmur, diastolic murmur, rubs, gallop, clicks


GI/Abdominal exam: Present: soft, tenderness (Right lower quadrant and left 

lower quadrant), guarding, normal bowel sounds.  Absent: rebound, rigid


Neurological exam: Present: alert, oriented X3, CN II-XII intact


Psychiatric exam: Present: normal affect, normal mood


Skin exam: Present: warm, dry, intact, normal color.  Absent: rash





<Anne-Marie Meadows - Last Filed: 07/03/22 16:40>





Course


                                   Vital Signs











  07/03/22 07/03/22 07/03/22





  00:21 03:19 05:35


 


Temperature 98.9 F  


 


Pulse Rate 133 H 92 95


 


Respiratory 20 18 18





Rate   


 


Blood Pressure 88/59 129/65 114/68


 


O2 Sat by Pulse 95 93 L 94 L





Oximetry   














  07/03/22





  06:50


 


Temperature 


 


Pulse Rate 87


 


Respiratory 18





Rate 


 


Blood Pressure 126/77


 


O2 Sat by Pulse 94 L





Oximetry 














Medical Decision Making





- Lab Data


Result diagrams: 


                                 07/03/22 02:36





                                 07/03/22 02:36





<Chris Ballard - Last Filed: 07/03/22 07:21>





- Lab Data


Result diagrams: 


                                 07/03/22 02:36





                                 07/03/22 02:36





<Anne-Marie Meadows - Last Filed: 07/03/22 16:40>





- Medical Decision Making


This is a 57-year-old male who presents to the emergency department for 

abdominal pain.  Patient noted to be hypotensive on initial evaluation, he was 

hooked up to the cardiac monitor and his pressure had improved to the 120s/70s. 

 Repeat lab work was obtained given the persistent symptoms.  Case signed out to

 ED attending Dr. Beltrán.


 (Anne-Marie Meadows)





- Lab Data


                                   Lab Results











  07/03/22 07/03/22 07/03/22 Range/Units





  02:36 02:36 02:36 


 


WBC  15.1 H    (3.8-10.6)  k/uL


 


RBC  5.53    (4.30-5.90)  m/uL


 


Hgb  17.1    (13.0-17.5)  gm/dL


 


Hct  49.3    (39.0-53.0)  %


 


MCV  89.1    (80.0-100.0)  fL


 


MCH  30.8    (25.0-35.0)  pg


 


MCHC  34.6    (31.0-37.0)  g/dL


 


RDW  13.4    (11.5-15.5)  %


 


Plt Count  324    (150-450)  k/uL


 


MPV  7.0    


 


Neutrophils %  86    %


 


Lymphocytes %  6    %


 


Monocytes %  6    %


 


Eosinophils %  1    %


 


Basophils %  0    %


 


Neutrophils #  13.0 H    (1.3-7.7)  k/uL


 


Lymphocytes #  0.9 L    (1.0-4.8)  k/uL


 


Monocytes #  0.9    (0-1.0)  k/uL


 


Eosinophils #  0.1    (0-0.7)  k/uL


 


Basophils #  0.1    (0-0.2)  k/uL


 


Manual Slide Review  Performed    


 


Sodium   137   (137-145)  mmol/L


 


Potassium   4.1   (3.5-5.1)  mmol/L


 


Chloride   103   ()  mmol/L


 


Carbon Dioxide   22   (22-30)  mmol/L


 


Anion Gap   12   mmol/L


 


BUN   12   (9-20)  mg/dL


 


Creatinine   1.76 H   (0.66-1.25)  mg/dL


 


Est GFR (CKD-EPI)AfAm   48   (>60 ml/min/1.73 sqM)  


 


Est GFR (CKD-EPI)NonAf   42   (>60 ml/min/1.73 sqM)  


 


Glucose   162 H   (74-99)  mg/dL


 


Lactic Ac Sepsis Rflx     


 


Plasma Lactic Acid Franko    2.9 H*  (0.7-2.0)  mmol/L


 


Calcium   9.4   (8.4-10.2)  mg/dL


 


Total Bilirubin   1.0   (0.2-1.3)  mg/dL


 


AST   26   (17-59)  U/L


 


ALT   33   (4-49)  U/L


 


Alkaline Phosphatase   70   ()  U/L


 


Total Protein   7.6   (6.3-8.2)  g/dL


 


Albumin   4.7   (3.5-5.0)  g/dL


 


Urine Color     


 


Urine Appearance     (Clear)  


 


Urine pH     (5.0-8.0)  


 


Ur Specific Gravity     (1.001-1.035)  


 


Urine Protein     (Negative)  


 


Urine Glucose (UA)     (Negative)  


 


Urine Ketones     (Negative)  


 


Urine Blood     (Negative)  


 


Urine Nitrite     (Negative)  


 


Urine Bilirubin     (Negative)  


 


Urine Urobilinogen     (<2.0)  mg/dL


 


Ur Leukocyte Esterase     (Negative)  


 


Urine RBC     (0-5)  /hpf


 


Urine WBC     (0-5)  /hpf


 


Urine Bacteria     (None)  /hpf


 


Hyaline Casts     (0-2)  /lpf


 


Granular Casts     (0)  /lpf


 


Urine Mucus     (None)  /hpf














  07/03/22 07/03/22 07/03/22 Range/Units





  03:39 05:48 06:21 


 


WBC     (3.8-10.6)  k/uL


 


RBC     (4.30-5.90)  m/uL


 


Hgb     (13.0-17.5)  gm/dL


 


Hct     (39.0-53.0)  %


 


MCV     (80.0-100.0)  fL


 


MCH     (25.0-35.0)  pg


 


MCHC     (31.0-37.0)  g/dL


 


RDW     (11.5-15.5)  %


 


Plt Count     (150-450)  k/uL


 


MPV     


 


Neutrophils %     %


 


Lymphocytes %     %


 


Monocytes %     %


 


Eosinophils %     %


 


Basophils %     %


 


Neutrophils #     (1.3-7.7)  k/uL


 


Lymphocytes #     (1.0-4.8)  k/uL


 


Monocytes #     (0-1.0)  k/uL


 


Eosinophils #     (0-0.7)  k/uL


 


Basophils #     (0-0.2)  k/uL


 


Manual Slide Review     


 


Sodium     (137-145)  mmol/L


 


Potassium     (3.5-5.1)  mmol/L


 


Chloride     ()  mmol/L


 


Carbon Dioxide     (22-30)  mmol/L


 


Anion Gap     mmol/L


 


BUN     (9-20)  mg/dL


 


Creatinine     (0.66-1.25)  mg/dL


 


Est GFR (CKD-EPI)AfAm     (>60 ml/min/1.73 sqM)  


 


Est GFR (CKD-EPI)NonAf     (>60 ml/min/1.73 sqM)  


 


Glucose     (74-99)  mg/dL


 


Lactic Ac Sepsis Rflx  Y    


 


Plasma Lactic Acid Franko    1.6  (0.7-2.0)  mmol/L


 


Calcium     (8.4-10.2)  mg/dL


 


Total Bilirubin     (0.2-1.3)  mg/dL


 


AST     (17-59)  U/L


 


ALT     (4-49)  U/L


 


Alkaline Phosphatase     ()  U/L


 


Total Protein     (6.3-8.2)  g/dL


 


Albumin     (3.5-5.0)  g/dL


 


Urine Color   Yellow   


 


Urine Appearance   Cloudy   (Clear)  


 


Urine pH   5.5   (5.0-8.0)  


 


Ur Specific Gravity   1.010   (1.001-1.035)  


 


Urine Protein   1+ H   (Negative)  


 


Urine Glucose (UA)   Negative   (Negative)  


 


Urine Ketones   Negative   (Negative)  


 


Urine Blood   Negative   (Negative)  


 


Urine Nitrite   Negative   (Negative)  


 


Urine Bilirubin   Negative   (Negative)  


 


Urine Urobilinogen   <2.0   (<2.0)  mg/dL


 


Ur Leukocyte Esterase   Trace H   (Negative)  


 


Urine RBC   <1   (0-5)  /hpf


 


Urine WBC   8 H   (0-5)  /hpf


 


Urine Bacteria   Occasional H   (None)  /hpf


 


Hyaline Casts   7 H   (0-2)  /lpf


 


Granular Casts   1   (0)  /lpf


 


Urine Mucus   Occasional H   (None)  /hpf














- EKG Data


EKG Comments: 


Normal sinus rhythm.  Right bundle branch block.  Ventricular rate 98 bpm, HI 

interval 159 ms,  ms,  ms.


 (Anne-Marie Meadows)





Disposition


Is patient prescribed a controlled substance at d/c from ED?: No





<Chris Ballard - Last Filed: 07/03/22 07:21>





<Anne-Marie Meadows - Last Filed: 07/03/22 16:40>


Clinical Impression: 


 Mechanical back pain, Dehydration





Disposition: HOME SELF-CARE


Condition: Good


Instructions (If sedation given, give patient instructions):  Acute Low Back 

Pain (ED)


Referrals: 


Lucio Candelaria MD [Primary Care Provider] - 1-2 days

## 2022-07-12 ENCOUNTER — HOSPITAL ENCOUNTER (OUTPATIENT)
Dept: HOSPITAL 47 - LABWHC1 | Age: 57
Discharge: HOME | End: 2022-07-12
Attending: SURGERY
Payer: COMMERCIAL

## 2022-07-12 DIAGNOSIS — N11.9: Primary | ICD-10-CM

## 2022-07-12 LAB
ALBUMIN SERPL-MCNC: 3.7 G/DL (ref 3.8–4.9)
ALBUMIN/GLOB SERPL: 1.21 G/DL (ref 1.6–3.17)
ALP SERPL-CCNC: 77 U/L (ref 41–126)
ALT SERPL-CCNC: 84 U/L (ref 10–49)
ANION GAP SERPL CALC-SCNC: 12.8 MMOL/L (ref 10–18)
AST SERPL-CCNC: 46 U/L (ref 14–35)
BUN SERPL-SCNC: 14.3 MG/DL (ref 9–27)
BUN/CREAT SERPL: 15.92 RATIO (ref 12–20)
CALCIUM SPEC-MCNC: 9 MG/DL (ref 8.7–10.3)
CHLORIDE SERPL-SCNC: 103 MMOL/L (ref 96–109)
CO2 SERPL-SCNC: 27.1 MMOL/L (ref 20–27.5)
ERYTHROCYTE [DISTWIDTH] IN BLOOD BY AUTOMATED COUNT: 4.43 X 10*6/UL (ref 4.4–5.6)
ERYTHROCYTE [DISTWIDTH] IN BLOOD: 13.4 % (ref 11.5–14.5)
GLOBULIN SER CALC-MCNC: 3.1 G/DL (ref 1.6–3.3)
GLUCOSE SERPL-MCNC: 92 MG/DL (ref 70–110)
HCT VFR BLD AUTO: 40 % (ref 39.6–50)
HGB BLD-MCNC: 12.8 G/DL (ref 13–17)
MCH RBC QN AUTO: 28.9 PG (ref 27–32)
MCHC RBC AUTO-ENTMCNC: 32 G/DL (ref 32–37)
MCV RBC AUTO: 90.3 FL (ref 80–97)
NRBC BLD AUTO-RTO: 0 /100 WBCS (ref 0–0)
PLATELET # BLD AUTO: 478 X 10*3/UL (ref 140–440)
POTASSIUM SERPL-SCNC: 4.2 MMOL/L (ref 3.5–5.5)
PROT SERPL-MCNC: 6.8 G/DL (ref 6.2–8.2)
SODIUM SERPL-SCNC: 143 MMOL/L (ref 135–145)
WBC # BLD AUTO: 10.23 X 10*3/UL (ref 4.5–10)

## 2022-07-12 PROCEDURE — 85027 COMPLETE CBC AUTOMATED: CPT

## 2022-07-12 PROCEDURE — 80053 COMPREHEN METABOLIC PANEL: CPT

## 2022-07-12 PROCEDURE — 36415 COLL VENOUS BLD VENIPUNCTURE: CPT
